# Patient Record
Sex: FEMALE | Race: BLACK OR AFRICAN AMERICAN | NOT HISPANIC OR LATINO | URBAN - METROPOLITAN AREA
[De-identification: names, ages, dates, MRNs, and addresses within clinical notes are randomized per-mention and may not be internally consistent; named-entity substitution may affect disease eponyms.]

---

## 2018-08-02 ENCOUNTER — OUTPATIENT (OUTPATIENT)
Dept: OUTPATIENT SERVICES | Facility: HOSPITAL | Age: 48
LOS: 1 days | Discharge: HOME | End: 2018-08-02
Payer: OTHER MISCELLANEOUS

## 2018-08-02 VITALS
HEIGHT: 64 IN | HEART RATE: 3 BPM | DIASTOLIC BLOOD PRESSURE: 89 MMHG | RESPIRATION RATE: 18 BRPM | TEMPERATURE: 98 F | OXYGEN SATURATION: 97 % | SYSTOLIC BLOOD PRESSURE: 143 MMHG | WEIGHT: 227.96 LBS

## 2018-08-02 DIAGNOSIS — Z01.818 ENCOUNTER FOR OTHER PREPROCEDURAL EXAMINATION: ICD-10-CM

## 2018-08-02 DIAGNOSIS — M54.17 RADICULOPATHY, LUMBOSACRAL REGION: ICD-10-CM

## 2018-08-02 DIAGNOSIS — J45.909 UNSPECIFIED ASTHMA, UNCOMPLICATED: ICD-10-CM

## 2018-08-02 DIAGNOSIS — M54.9 DORSALGIA, UNSPECIFIED: ICD-10-CM

## 2018-08-02 LAB
ANION GAP SERPL CALC-SCNC: 9 MMOL/L — SIGNIFICANT CHANGE UP (ref 5–17)
APTT BLD: 36.1 SEC — SIGNIFICANT CHANGE UP (ref 27.5–37.4)
BASOPHILS # BLD AUTO: 0.02 K/UL — SIGNIFICANT CHANGE UP (ref 0–0.2)
BASOPHILS NFR BLD AUTO: 0.2 % — SIGNIFICANT CHANGE UP (ref 0–2)
BUN SERPL-MCNC: 14 MG/DL — SIGNIFICANT CHANGE UP (ref 7–23)
CALCIUM SERPL-MCNC: 8.8 MG/DL — SIGNIFICANT CHANGE UP (ref 8.5–10.1)
CHLORIDE SERPL-SCNC: 106 MMOL/L — SIGNIFICANT CHANGE UP (ref 96–108)
CO2 SERPL-SCNC: 26 MMOL/L — SIGNIFICANT CHANGE UP (ref 22–31)
CREAT SERPL-MCNC: 0.98 MG/DL — SIGNIFICANT CHANGE UP (ref 0.5–1.3)
EOSINOPHIL # BLD AUTO: 0.16 K/UL — SIGNIFICANT CHANGE UP (ref 0–0.5)
EOSINOPHIL NFR BLD AUTO: 1.4 % — SIGNIFICANT CHANGE UP (ref 0–6)
GLUCOSE SERPL-MCNC: 89 MG/DL — SIGNIFICANT CHANGE UP (ref 70–99)
HBA1C BLD-MCNC: 6.3 % — HIGH (ref 4–5.6)
HCG UR QL: NEGATIVE — SIGNIFICANT CHANGE UP
HCT VFR BLD CALC: 32.7 % — LOW (ref 34.5–45)
HCV AB S/CO SERPL IA: 0.07 S/CO — SIGNIFICANT CHANGE UP
HCV AB SERPL-IMP: SIGNIFICANT CHANGE UP
HGB BLD-MCNC: 9.5 G/DL — LOW (ref 11.5–15.5)
HIV 1 & 2 AB SERPL IA.RAPID: SIGNIFICANT CHANGE UP
IMM GRANULOCYTES NFR BLD AUTO: 0.3 % — SIGNIFICANT CHANGE UP (ref 0–1.5)
INR BLD: 1.07 RATIO — SIGNIFICANT CHANGE UP (ref 0.88–1.16)
LYMPHOCYTES # BLD AUTO: 27.7 % — SIGNIFICANT CHANGE UP (ref 13–44)
LYMPHOCYTES # BLD AUTO: 3.27 K/UL — SIGNIFICANT CHANGE UP (ref 1–3.3)
MCHC RBC-ENTMCNC: 21.8 PG — LOW (ref 27–34)
MCHC RBC-ENTMCNC: 29.1 GM/DL — LOW (ref 32–36)
MCV RBC AUTO: 75 FL — LOW (ref 80–100)
MONOCYTES # BLD AUTO: 0.64 K/UL — SIGNIFICANT CHANGE UP (ref 0–0.9)
MONOCYTES NFR BLD AUTO: 5.4 % — SIGNIFICANT CHANGE UP (ref 2–14)
MRSA PCR RESULT.: SIGNIFICANT CHANGE UP
NEUTROPHILS # BLD AUTO: 7.67 K/UL — HIGH (ref 1.8–7.4)
NEUTROPHILS NFR BLD AUTO: 65 % — SIGNIFICANT CHANGE UP (ref 43–77)
PLATELET # BLD AUTO: 591 K/UL — HIGH (ref 150–400)
POTASSIUM SERPL-MCNC: 3.5 MMOL/L — SIGNIFICANT CHANGE UP (ref 3.5–5.3)
POTASSIUM SERPL-SCNC: 3.5 MMOL/L — SIGNIFICANT CHANGE UP (ref 3.5–5.3)
PROTHROM AB SERPL-ACNC: 11.7 SEC — SIGNIFICANT CHANGE UP (ref 9.8–12.7)
RBC # BLD: 4.36 M/UL — SIGNIFICANT CHANGE UP (ref 3.8–5.2)
RBC # FLD: 19.3 % — HIGH (ref 10.3–14.5)
S AUREUS DNA NOSE QL NAA+PROBE: SIGNIFICANT CHANGE UP
SODIUM SERPL-SCNC: 141 MMOL/L — SIGNIFICANT CHANGE UP (ref 135–145)
WBC # BLD: 11.8 K/UL — HIGH (ref 3.8–10.5)
WBC # FLD AUTO: 11.8 K/UL — HIGH (ref 3.8–10.5)

## 2018-08-02 PROCEDURE — 93010 ELECTROCARDIOGRAM REPORT: CPT | Mod: NC

## 2018-08-02 NOTE — H&P PST ADULT - NSANTHOSAYNRD_GEN_A_CORE
No. ESTER screening performed.  STOP BANG Legend: 0-2 = LOW Risk; 3-4 = INTERMEDIATE Risk; 5-8 = HIGH Risk

## 2018-08-02 NOTE — H&P PST ADULT - HISTORY OF PRESENT ILLNESS
47 yo female sustained back injury while on the job 10/7/2017. back pains worse despite conservative management- now has numbness down th leg. MRI - spinal compression- scheduled for spinal decompression

## 2018-08-02 NOTE — H&P PST ADULT - ASSESSMENT
lumbar compression  CAPRINI SCORE    AGE RELATED RISK FACTORS                                                       MOBILITY RELATED FACTORS  [x ] Age 41-60 years                                            (1 Point)                  [ ] Bed rest                                                        (1 Point)  [ ] Age: 61-74 years                                           (2 Points)                [ ] Plaster cast                                                   (2 Points)  [ ] Age= 75 years                                              (3 Points)                 [ ] Bed bound for more than 72 hours                   (2 Points)    DISEASE RELATED RISK FACTORS                                               GENDER SPECIFIC FACTORS  [ ] Edema in the lower extremities                       (1 Point)                  [ ] Pregnancy                                                     (1 Point)  [ ] Varicose veins                                               (1 Point)                  [ ] Post-partum < 6 weeks                                   (1 Point)             [x ] BMI > 25 Kg/m2                                            (1 Point)                  [ ] Hormonal therapy  or oral contraception            (1 Point)                 [ ] Sepsis (in the previous month)                        (1 Point)                  [ ] History of pregnancy complications  [ ] Pneumonia or serious lung disease                                               [ ] Unexplained or recurrent                       (1 Point)           (in the previous month)                               (1 Point)  [ ] Abnormal pulmonary function test                     (1 Point)                 SURGERY RELATED RISK FACTORS  [ ] Acute myocardial infarction                              (1 Point)                 [ ]  Section                                            (1 Point)  [ ] Congestive heart failure (in the previous month)  (1 Point)                 [ ] Minor surgery                                                 (1 Point)   [ ] Inflammatory bowel disease                             (1 Point)                 [x ] Arthroscopic surgery                                        (2 Points)  [ ] Central venous access                                    (2 Points)                [ ] General surgery lasting more than 45 minutes   (2 Points)       [ ] Stroke (in the previous month)                          (5 Points)               [ ] Elective arthroplasty                                        (5 Points)                                                                                                                                               HEMATOLOGY RELATED FACTORS                                                 TRAUMA RELATED RISK FACTORS  [ ] Prior episodes of VTE                                     (3 Points)                 [ ] Fracture of the hip, pelvis, or leg                       (5 Points)  [ ] Positive family history for VTE                         (3 Points)                 [ ] Acute spinal cord injury (in the previous month)  (5 Points)  [ ] Prothrombin 93546 A                                      (3 Points)                 [ ] Paralysis  (less than 1 month)                          (5 Points)  [ ] Factor V Leiden                                             (3 Points)                 [ ] Multiple Trauma within 1 month                         (5 Points)  [ ] Lupus anticoagulants                                     (3 Points)                                                           [ ] Anticardiolipin antibodies                                (3 Points)                                                       [ ] High homocysteine in the blood                      (3 Points)                                             [ ] Other congenital or acquired thrombophilia       (3 Points)                                                [ ] Heparin induced thrombocytopenia                  (3 Points)                                          Total Score [      4    ]

## 2018-08-03 RX ORDER — SODIUM CHLORIDE 9 MG/ML
3 INJECTION INTRAMUSCULAR; INTRAVENOUS; SUBCUTANEOUS EVERY 8 HOURS
Qty: 0 | Refills: 0 | Status: DISCONTINUED | OUTPATIENT
Start: 2018-08-14 | End: 2018-08-14

## 2018-08-14 ENCOUNTER — INPATIENT (INPATIENT)
Facility: HOSPITAL | Age: 48
LOS: 2 days | Discharge: HOME | End: 2018-08-17
Attending: ORTHOPAEDIC SURGERY | Admitting: ORTHOPAEDIC SURGERY
Payer: OTHER MISCELLANEOUS

## 2018-08-14 VITALS
RESPIRATION RATE: 16 BRPM | WEIGHT: 227.96 LBS | HEART RATE: 98 BPM | OXYGEN SATURATION: 98 % | SYSTOLIC BLOOD PRESSURE: 151 MMHG | TEMPERATURE: 98 F | HEIGHT: 64 IN | DIASTOLIC BLOOD PRESSURE: 88 MMHG

## 2018-08-14 LAB
ANION GAP SERPL CALC-SCNC: 12 MMOL/L — SIGNIFICANT CHANGE UP (ref 5–17)
BASOPHILS # BLD AUTO: 0.03 K/UL — SIGNIFICANT CHANGE UP (ref 0–0.2)
BASOPHILS # BLD AUTO: 0.03 K/UL — SIGNIFICANT CHANGE UP (ref 0–0.2)
BASOPHILS NFR BLD AUTO: 0.2 % — SIGNIFICANT CHANGE UP (ref 0–2)
BASOPHILS NFR BLD AUTO: 0.3 % — SIGNIFICANT CHANGE UP (ref 0–2)
BUN SERPL-MCNC: 9 MG/DL — SIGNIFICANT CHANGE UP (ref 7–23)
CALCIUM SERPL-MCNC: 8.4 MG/DL — LOW (ref 8.5–10.1)
CHLORIDE SERPL-SCNC: 102 MMOL/L — SIGNIFICANT CHANGE UP (ref 96–108)
CO2 SERPL-SCNC: 26 MMOL/L — SIGNIFICANT CHANGE UP (ref 22–31)
CREAT SERPL-MCNC: 1.27 MG/DL — SIGNIFICANT CHANGE UP (ref 0.5–1.3)
EOSINOPHIL # BLD AUTO: 0 K/UL — SIGNIFICANT CHANGE UP (ref 0–0.5)
EOSINOPHIL # BLD AUTO: 0.22 K/UL — SIGNIFICANT CHANGE UP (ref 0–0.5)
EOSINOPHIL NFR BLD AUTO: 0 % — SIGNIFICANT CHANGE UP (ref 0–6)
EOSINOPHIL NFR BLD AUTO: 2.1 % — SIGNIFICANT CHANGE UP (ref 0–6)
GLUCOSE SERPL-MCNC: 164 MG/DL — HIGH (ref 70–99)
HCG UR QL: NEGATIVE — SIGNIFICANT CHANGE UP
HCT VFR BLD CALC: 32.1 % — LOW (ref 34.5–45)
HCT VFR BLD CALC: 33.3 % — LOW (ref 34.5–45)
HGB BLD-MCNC: 10 G/DL — LOW (ref 11.5–15.5)
HGB BLD-MCNC: 9.6 G/DL — LOW (ref 11.5–15.5)
IMM GRANULOCYTES NFR BLD AUTO: 0.3 % — SIGNIFICANT CHANGE UP (ref 0–1.5)
IMM GRANULOCYTES NFR BLD AUTO: 1.2 % — SIGNIFICANT CHANGE UP (ref 0–1.5)
LYMPHOCYTES # BLD AUTO: 15.4 % — SIGNIFICANT CHANGE UP (ref 13–44)
LYMPHOCYTES # BLD AUTO: 2.65 K/UL — SIGNIFICANT CHANGE UP (ref 1–3.3)
LYMPHOCYTES # BLD AUTO: 4.46 K/UL — HIGH (ref 1–3.3)
LYMPHOCYTES # BLD AUTO: 41.7 % — SIGNIFICANT CHANGE UP (ref 13–44)
MCHC RBC-ENTMCNC: 22.6 PG — LOW (ref 27–34)
MCHC RBC-ENTMCNC: 22.7 PG — LOW (ref 27–34)
MCHC RBC-ENTMCNC: 29.9 GM/DL — LOW (ref 32–36)
MCHC RBC-ENTMCNC: 30 GM/DL — LOW (ref 32–36)
MCV RBC AUTO: 75.2 FL — LOW (ref 80–100)
MCV RBC AUTO: 75.9 FL — LOW (ref 80–100)
MONOCYTES # BLD AUTO: 0.21 K/UL — SIGNIFICANT CHANGE UP (ref 0–0.9)
MONOCYTES # BLD AUTO: 0.62 K/UL — SIGNIFICANT CHANGE UP (ref 0–0.9)
MONOCYTES NFR BLD AUTO: 1.2 % — LOW (ref 2–14)
MONOCYTES NFR BLD AUTO: 5.8 % — SIGNIFICANT CHANGE UP (ref 2–14)
NEUTROPHILS # BLD AUTO: 14.16 K/UL — HIGH (ref 1.8–7.4)
NEUTROPHILS # BLD AUTO: 5.34 K/UL — SIGNIFICANT CHANGE UP (ref 1.8–7.4)
NEUTROPHILS NFR BLD AUTO: 49.8 % — SIGNIFICANT CHANGE UP (ref 43–77)
NEUTROPHILS NFR BLD AUTO: 82 % — HIGH (ref 43–77)
PLATELET # BLD AUTO: 467 K/UL — HIGH (ref 150–400)
PLATELET # BLD AUTO: 509 K/UL — HIGH (ref 150–400)
POTASSIUM SERPL-MCNC: 4.3 MMOL/L — SIGNIFICANT CHANGE UP (ref 3.5–5.3)
POTASSIUM SERPL-SCNC: 4.3 MMOL/L — SIGNIFICANT CHANGE UP (ref 3.5–5.3)
RBC # BLD: 4.23 M/UL — SIGNIFICANT CHANGE UP (ref 3.8–5.2)
RBC # BLD: 4.43 M/UL — SIGNIFICANT CHANGE UP (ref 3.8–5.2)
RBC # FLD: 19.2 % — HIGH (ref 10.3–14.5)
RBC # FLD: 19.6 % — HIGH (ref 10.3–14.5)
SODIUM SERPL-SCNC: 140 MMOL/L — SIGNIFICANT CHANGE UP (ref 135–145)
WBC # BLD: 10.7 K/UL — HIGH (ref 3.8–10.5)
WBC # BLD: 17.26 K/UL — HIGH (ref 3.8–10.5)
WBC # FLD AUTO: 10.7 K/UL — HIGH (ref 3.8–10.5)
WBC # FLD AUTO: 17.26 K/UL — HIGH (ref 3.8–10.5)

## 2018-08-14 RX ORDER — HYDROMORPHONE HYDROCHLORIDE 2 MG/ML
0.5 INJECTION INTRAMUSCULAR; INTRAVENOUS; SUBCUTANEOUS
Qty: 0 | Refills: 0 | Status: DISCONTINUED | OUTPATIENT
Start: 2018-08-14 | End: 2018-08-17

## 2018-08-14 RX ORDER — CYCLOBENZAPRINE HYDROCHLORIDE 10 MG/1
10 TABLET, FILM COATED ORAL EVERY 8 HOURS
Qty: 0 | Refills: 0 | Status: DISCONTINUED | OUTPATIENT
Start: 2018-08-14 | End: 2018-08-17

## 2018-08-14 RX ORDER — SODIUM CHLORIDE 9 MG/ML
1000 INJECTION, SOLUTION INTRAVENOUS
Qty: 0 | Refills: 0 | Status: DISCONTINUED | OUTPATIENT
Start: 2018-08-14 | End: 2018-08-15

## 2018-08-14 RX ORDER — DIAZEPAM 5 MG
5 TABLET ORAL EVERY 8 HOURS
Qty: 0 | Refills: 0 | Status: DISCONTINUED | OUTPATIENT
Start: 2018-08-14 | End: 2018-08-15

## 2018-08-14 RX ORDER — ONDANSETRON 8 MG/1
4 TABLET, FILM COATED ORAL EVERY 6 HOURS
Qty: 0 | Refills: 0 | Status: DISCONTINUED | OUTPATIENT
Start: 2018-08-14 | End: 2018-08-17

## 2018-08-14 RX ORDER — FOLIC ACID 0.8 MG
1 TABLET ORAL DAILY
Qty: 0 | Refills: 0 | Status: DISCONTINUED | OUTPATIENT
Start: 2018-08-14 | End: 2018-08-17

## 2018-08-14 RX ORDER — ACETAMINOPHEN 500 MG
650 TABLET ORAL EVERY 6 HOURS
Qty: 0 | Refills: 0 | Status: DISCONTINUED | OUTPATIENT
Start: 2018-08-14 | End: 2018-08-17

## 2018-08-14 RX ORDER — DIPHENHYDRAMINE HCL 50 MG
25 CAPSULE ORAL AT BEDTIME
Qty: 0 | Refills: 0 | Status: DISCONTINUED | OUTPATIENT
Start: 2018-08-14 | End: 2018-08-17

## 2018-08-14 RX ORDER — OXYCODONE HYDROCHLORIDE 5 MG/1
10 TABLET ORAL EVERY 4 HOURS
Qty: 0 | Refills: 0 | Status: DISCONTINUED | OUTPATIENT
Start: 2018-08-14 | End: 2018-08-17

## 2018-08-14 RX ORDER — ASCORBIC ACID 60 MG
500 TABLET,CHEWABLE ORAL
Qty: 0 | Refills: 0 | Status: DISCONTINUED | OUTPATIENT
Start: 2018-08-14 | End: 2018-08-17

## 2018-08-14 RX ORDER — CYCLOBENZAPRINE HYDROCHLORIDE 10 MG/1
1 TABLET, FILM COATED ORAL
Qty: 0 | Refills: 0 | COMMUNITY

## 2018-08-14 RX ORDER — DIPHENHYDRAMINE HCL 50 MG
25 CAPSULE ORAL EVERY 6 HOURS
Qty: 0 | Refills: 0 | Status: DISCONTINUED | OUTPATIENT
Start: 2018-08-14 | End: 2018-08-17

## 2018-08-14 RX ORDER — CEFAZOLIN SODIUM 1 G
2000 VIAL (EA) INJECTION EVERY 8 HOURS
Qty: 0 | Refills: 0 | Status: DISCONTINUED | OUTPATIENT
Start: 2018-08-14 | End: 2018-08-17

## 2018-08-14 RX ORDER — SODIUM CHLORIDE 9 MG/ML
1000 INJECTION, SOLUTION INTRAVENOUS
Qty: 0 | Refills: 0 | Status: DISCONTINUED | OUTPATIENT
Start: 2018-08-14 | End: 2018-08-14

## 2018-08-14 RX ORDER — OXYCODONE HYDROCHLORIDE 5 MG/1
5 TABLET ORAL EVERY 4 HOURS
Qty: 0 | Refills: 0 | Status: DISCONTINUED | OUTPATIENT
Start: 2018-08-14 | End: 2018-08-17

## 2018-08-14 RX ORDER — SENNA PLUS 8.6 MG/1
2 TABLET ORAL AT BEDTIME
Qty: 0 | Refills: 0 | Status: DISCONTINUED | OUTPATIENT
Start: 2018-08-14 | End: 2018-08-17

## 2018-08-14 RX ORDER — BENZOCAINE AND MENTHOL 5; 1 G/100ML; G/100ML
1 LIQUID ORAL EVERY 4 HOURS
Qty: 0 | Refills: 0 | Status: DISCONTINUED | OUTPATIENT
Start: 2018-08-14 | End: 2018-08-17

## 2018-08-14 RX ORDER — HYDROMORPHONE HYDROCHLORIDE 2 MG/ML
1 INJECTION INTRAMUSCULAR; INTRAVENOUS; SUBCUTANEOUS
Qty: 0 | Refills: 0 | Status: DISCONTINUED | OUTPATIENT
Start: 2018-08-14 | End: 2018-08-17

## 2018-08-14 RX ORDER — ONDANSETRON 8 MG/1
4 TABLET, FILM COATED ORAL ONCE
Qty: 0 | Refills: 0 | Status: DISCONTINUED | OUTPATIENT
Start: 2018-08-14 | End: 2018-08-14

## 2018-08-14 RX ORDER — DOCUSATE SODIUM 100 MG
100 CAPSULE ORAL THREE TIMES A DAY
Qty: 0 | Refills: 0 | Status: DISCONTINUED | OUTPATIENT
Start: 2018-08-14 | End: 2018-08-17

## 2018-08-14 RX ADMIN — HYDROMORPHONE HYDROCHLORIDE 0.5 MILLIGRAM(S): 2 INJECTION INTRAMUSCULAR; INTRAVENOUS; SUBCUTANEOUS at 17:10

## 2018-08-14 RX ADMIN — Medication 100 MILLIGRAM(S): at 21:32

## 2018-08-14 RX ADMIN — Medication 500 MILLIGRAM(S): at 18:08

## 2018-08-14 RX ADMIN — OXYCODONE HYDROCHLORIDE 10 MILLIGRAM(S): 5 TABLET ORAL at 19:20

## 2018-08-14 RX ADMIN — Medication 100 MILLIGRAM(S): at 18:08

## 2018-08-14 RX ADMIN — Medication 1 TABLET(S): at 21:32

## 2018-08-14 RX ADMIN — SENNA PLUS 2 TABLET(S): 8.6 TABLET ORAL at 21:32

## 2018-08-14 RX ADMIN — HYDROMORPHONE HYDROCHLORIDE 0.5 MILLIGRAM(S): 2 INJECTION INTRAMUSCULAR; INTRAVENOUS; SUBCUTANEOUS at 16:45

## 2018-08-14 RX ADMIN — OXYCODONE HYDROCHLORIDE 10 MILLIGRAM(S): 5 TABLET ORAL at 18:35

## 2018-08-14 RX ADMIN — SODIUM CHLORIDE 100 MILLILITER(S): 9 INJECTION, SOLUTION INTRAVENOUS at 16:12

## 2018-08-14 RX ADMIN — SODIUM CHLORIDE 75 MILLILITER(S): 9 INJECTION, SOLUTION INTRAVENOUS at 21:35

## 2018-08-14 NOTE — BRIEF OPERATIVE NOTE - PROCEDURE
<<-----Click on this checkbox to enter Procedure TLIF, 1 level, posterior approach  08/15/2018    Active  ANNIKA  Posterior spinal instrumentation of 3 to 6 vertebral segments  08/15/2018    Active  ANNIKA

## 2018-08-14 NOTE — PROGRESS NOTE ADULT - SUBJECTIVE AND OBJECTIVE BOX
[facility]  Operative Report  Date of Surgery:08/14/2018  Patient: Priscila Cuba  Surgeon: Tigist Gill DO – Orthopedic Surgery Attending  Assistant: Orthopedic Resident Dr. Soria and Dr. Abraham  Dictation:   Preop Diagnosis:  Lumbar Disc Herniation L5/S1, Lumbar Stenosis L5/S1, Broad based bulge L4/5, lumbar radiculopathy, severe interactable back pain, difficulty with ambulating, patient had multiple rounds of epidural injections.  Symptoms ongoing for > 6 months.    Post op Diagnosis: Same  Procedure Summary:   L5/S1 Partial Facetectomy,  Right sided zia Laminectomy of L5,    Right sided hemilaminectomy of S1  Left sided Superior laminectomy of S1  Left sided Inferior hemilaminectomy of L5  Right sided inferior zia Laminectomy of L4,    Right sided superior hemilaminectomy of L5  Left sided Superior laminectomy of S1  Left sided Inferior hemilaminectomy of L5  Segmental Instrumentation L4/5/S1 with Pedicle Screw Insertion and Interconnecting bilateral rods  Adilson Osteotomy L5/S1  Biomechanical cage L5/S1  Interbody fusion L5/S1  Arthrodesis L5/S1 Posterolateral  Arthrodesis L4/L5 Posterolateral  Subtotal Discectomy L5/S1  Local autograft Harvestation  Fluoroscopy  High field magnification for decompression  Allograft  BMP    Anesthesia: General Endotracheal Intubation    Positioning: Prone on Nick Table with Ankush frame    Complication None:    Procedure in Detail:  Preoperative Consent was obtained.  Patient has severe back pain and bilateral leg pain. Patient has significant bilateral leg pain.  She reports overall pain score is severe with activity and moderate even on rest on most days.  Symptoms ongoing for greater than 1 year.   Due to severe back pain, lumbar fusion was added to her surgical treatment. Symptoms have not improved with multiple treatments and a long non operative course.  Extensive discussion regarding patient’s diagnosis, imaging, operative and non-operative options discussed with patient over multiple visits.  All questions are answered.  Informed consent in obtained.  No guarantees implied.  Goals are improvement in pain and quality of life.  Some residual discomfort expected.    Patient received general anesthesia.  Neuromonitoring devices were placed by neuromonitoring service.  Dean catheter was inserted. Patient was placed prone on the operative table.  All bony prominences were padded.  Neuromonitoring signals were confirmed.     Patient’s lumbar spine was prepped and draped in sterile manner.  Flouroscopy was used to confirm the location of the pedicle. Midline incision were made and lamina of L4, L5 and S1 exposed.      Interspace confirmed with xray.  Subsequently, facet joints and TP was exposed from L4-L5-S1.        Each pedicle was then tapped and stimulated.  All screws were above the threshold of 10. Pedicle stimulation was more than 10 at all levels.  Subsequently 5.5*40mm, 5.5 *45mm and 5.5*35mm, 6.5*40mm Medtronic Solera pedicle screws were instrumented due to excellent bone quality otherwise. At this point wound was irrigated.  Midline interspinous ligament was preserved to prevent any additional instability of spine and part of minimum invasive techniques.      For Decompression, I performed 4 bilateral hemilaminectomies.  2 level hemilaminectomy is performed at each level bilateral.      First we started at L4/L5 level on right.  Inferior lamina of L4 is resected and superior lamina of L5 on the right is resected.  Ligamentum flavum is release from its attachment on L5 and then released laterally and proximally.   This is done first on the left side.  Nerve root is free.    Next we started at L4/L5 level on left.  Inferior lamina of L4 is resected and superior lamina of L5 on the left is resected.  Ligamentum flavum is release from its attachment on L5 and then released laterally and proximally.   This is done first on the right side.  Nerve root is free.    Next we started at L5/S1 level on left.  Inferior lamina of L5 is resected and superior lamina of S1 on the left is resected.  Ligamentum flavum is release from its attachment on L5 and then released laterally and proximally.   This is done first on the left side.  Nerve root is free.    Next we started at L5/S1 level on right.  Inferior lamina of L5 is resected and superior lamina of S1 on the right is resected.  Ligamentum flavum is release from its attachment on L5 and then released laterally and proximally.   This is done first on the right side.  Nerve root is free.    Next Adilson osteotomy, is performed on the right at L5/S1 to correct the retrolisthesis at L5/S1.  Complete L5 facet is removed and superior S1 facet is removed.  Subsequently, discectomy is performed and then disc space is prepared with jennifer, curretes and rasps.    Next,harvested autograft bone  and allograft graft is packed into the disc space and cage and inserted then Interbody cage is inserted.  It's location confirmed and then expanded to restore the height of the L5/S1 disc space and obtain foraminal decompression.     No pressure on the nerve roots noted at all 4 levels. [place] was used to confirm adequate foraminal space as well.  Subsequently, BMP, autograft from the facets and partial laminotomy and decortication and allograft using coricocancellous chips were placed in the gutters.  Subsequently, concepción was inserted and set screws placed.  All set screws were final tightened.  Final xrays were taken and I was satisfied with overall positioning of hardware and instrumentation.     Next, each incision was closed with number 1 vicryl for fascia, 0 vicryl for subcutaneous tissue and 2.0 vicryl.  Drain was inserted over the epidural space.   2gm of vancomycin powder was also placed in deep and superficial fascia.    Skin adhesive was applied.  Dry sterile dressing was applied.  Patient was transferred to supine position on regular bed.  Patient was extubated.  Patient had tolerated the procedure well.  Patient was moving b/l lower extremity    I personally spoke with patient's family at the end of the case.    Tigist Gill DO  Orthopedic Spine Surgeon

## 2018-08-15 LAB
ANION GAP SERPL CALC-SCNC: 8 MMOL/L — SIGNIFICANT CHANGE UP (ref 5–17)
BASOPHILS # BLD AUTO: 0.02 K/UL — SIGNIFICANT CHANGE UP (ref 0–0.2)
BASOPHILS NFR BLD AUTO: 0.1 % — SIGNIFICANT CHANGE UP (ref 0–2)
BUN SERPL-MCNC: 8 MG/DL — SIGNIFICANT CHANGE UP (ref 7–23)
CALCIUM SERPL-MCNC: 8.8 MG/DL — SIGNIFICANT CHANGE UP (ref 8.5–10.1)
CHLORIDE SERPL-SCNC: 104 MMOL/L — SIGNIFICANT CHANGE UP (ref 96–108)
CO2 SERPL-SCNC: 29 MMOL/L — SIGNIFICANT CHANGE UP (ref 22–31)
CREAT SERPL-MCNC: 0.93 MG/DL — SIGNIFICANT CHANGE UP (ref 0.5–1.3)
EOSINOPHIL # BLD AUTO: 0 K/UL — SIGNIFICANT CHANGE UP (ref 0–0.5)
EOSINOPHIL NFR BLD AUTO: 0 % — SIGNIFICANT CHANGE UP (ref 0–6)
GLUCOSE SERPL-MCNC: 112 MG/DL — HIGH (ref 70–99)
HCT VFR BLD CALC: 31.4 % — LOW (ref 34.5–45)
HGB BLD-MCNC: 9.2 G/DL — LOW (ref 11.5–15.5)
IMM GRANULOCYTES NFR BLD AUTO: 0.7 % — SIGNIFICANT CHANGE UP (ref 0–1.5)
LYMPHOCYTES # BLD AUTO: 11.4 % — LOW (ref 13–44)
LYMPHOCYTES # BLD AUTO: 2.26 K/UL — SIGNIFICANT CHANGE UP (ref 1–3.3)
MCHC RBC-ENTMCNC: 22.4 PG — LOW (ref 27–34)
MCHC RBC-ENTMCNC: 29.3 GM/DL — LOW (ref 32–36)
MCV RBC AUTO: 76.6 FL — LOW (ref 80–100)
MONOCYTES # BLD AUTO: 1.11 K/UL — HIGH (ref 0–0.9)
MONOCYTES NFR BLD AUTO: 5.6 % — SIGNIFICANT CHANGE UP (ref 2–14)
NEUTROPHILS # BLD AUTO: 16.32 K/UL — HIGH (ref 1.8–7.4)
NEUTROPHILS NFR BLD AUTO: 82.2 % — HIGH (ref 43–77)
PLATELET # BLD AUTO: 486 K/UL — HIGH (ref 150–400)
POTASSIUM SERPL-MCNC: 4.1 MMOL/L — SIGNIFICANT CHANGE UP (ref 3.5–5.3)
POTASSIUM SERPL-SCNC: 4.1 MMOL/L — SIGNIFICANT CHANGE UP (ref 3.5–5.3)
RBC # BLD: 4.1 M/UL — SIGNIFICANT CHANGE UP (ref 3.8–5.2)
RBC # FLD: 19.4 % — HIGH (ref 10.3–14.5)
SODIUM SERPL-SCNC: 141 MMOL/L — SIGNIFICANT CHANGE UP (ref 135–145)
WBC # BLD: 19.84 K/UL — HIGH (ref 3.8–10.5)
WBC # FLD AUTO: 19.84 K/UL — HIGH (ref 3.8–10.5)

## 2018-08-15 RX ORDER — IBUPROFEN 200 MG
1 TABLET ORAL
Qty: 0 | Refills: 0 | COMMUNITY

## 2018-08-15 RX ADMIN — Medication 650 MILLIGRAM(S): at 18:40

## 2018-08-15 RX ADMIN — Medication 100 MILLIGRAM(S): at 19:39

## 2018-08-15 RX ADMIN — Medication 100 MILLIGRAM(S): at 02:07

## 2018-08-15 RX ADMIN — OXYCODONE HYDROCHLORIDE 10 MILLIGRAM(S): 5 TABLET ORAL at 07:20

## 2018-08-15 RX ADMIN — Medication 100 MILLIGRAM(S): at 21:37

## 2018-08-15 RX ADMIN — Medication 500 MILLIGRAM(S): at 06:22

## 2018-08-15 RX ADMIN — OXYCODONE HYDROCHLORIDE 10 MILLIGRAM(S): 5 TABLET ORAL at 06:22

## 2018-08-15 RX ADMIN — Medication 1 TABLET(S): at 06:22

## 2018-08-15 RX ADMIN — SODIUM CHLORIDE 75 MILLILITER(S): 9 INJECTION, SOLUTION INTRAVENOUS at 11:57

## 2018-08-15 RX ADMIN — CYCLOBENZAPRINE HYDROCHLORIDE 10 MILLIGRAM(S): 10 TABLET, FILM COATED ORAL at 21:38

## 2018-08-15 RX ADMIN — Medication 1 TABLET(S): at 13:24

## 2018-08-15 RX ADMIN — Medication 1 TABLET(S): at 11:57

## 2018-08-15 RX ADMIN — Medication 100 MILLIGRAM(S): at 13:24

## 2018-08-15 RX ADMIN — Medication 500 MILLIGRAM(S): at 17:23

## 2018-08-15 RX ADMIN — OXYCODONE HYDROCHLORIDE 10 MILLIGRAM(S): 5 TABLET ORAL at 13:23

## 2018-08-15 RX ADMIN — Medication 100 MILLIGRAM(S): at 06:22

## 2018-08-15 RX ADMIN — Medication 100 MILLIGRAM(S): at 11:15

## 2018-08-15 RX ADMIN — OXYCODONE HYDROCHLORIDE 10 MILLIGRAM(S): 5 TABLET ORAL at 19:35

## 2018-08-15 RX ADMIN — Medication 1 MILLIGRAM(S): at 11:57

## 2018-08-15 RX ADMIN — HYDROMORPHONE HYDROCHLORIDE 1 MILLIGRAM(S): 2 INJECTION INTRAMUSCULAR; INTRAVENOUS; SUBCUTANEOUS at 10:20

## 2018-08-15 RX ADMIN — OXYCODONE HYDROCHLORIDE 10 MILLIGRAM(S): 5 TABLET ORAL at 18:39

## 2018-08-15 RX ADMIN — HYDROMORPHONE HYDROCHLORIDE 1 MILLIGRAM(S): 2 INJECTION INTRAMUSCULAR; INTRAVENOUS; SUBCUTANEOUS at 09:47

## 2018-08-15 RX ADMIN — Medication 1 TABLET(S): at 21:37

## 2018-08-15 RX ADMIN — OXYCODONE HYDROCHLORIDE 10 MILLIGRAM(S): 5 TABLET ORAL at 14:30

## 2018-08-15 RX ADMIN — SENNA PLUS 2 TABLET(S): 8.6 TABLET ORAL at 21:36

## 2018-08-15 NOTE — DISCHARGE NOTE ADULT - PAIN PRESENT
Take your medications exactly as prescribed. Having your pain under control will help increase activity, improve deep breathing and coughing and prevent complications like pneumonia and blood clots in your legs. Some of the common side effects of pain medications are nausea, vomiting, itching, rash and upset stomach. Contact your doctor if you develop these or any other unusual systoms. Eat a diet rich in fiber and drink plenty of oral fluids. Use other pain management methods like, cold and warm applications, elevation of affected body part, listening to music, watching TV, yoga, etc./No

## 2018-08-15 NOTE — DISCHARGE NOTE ADULT - CARE PROVIDER_API CALL
Tigist Gill (DO), Orthopaedic Surgery Orthopaedics Surgery  [address] [place] [address]  Phone: [phone]  Fax: [phone]

## 2018-08-15 NOTE — PHYSICAL THERAPY INITIAL EVALUATION ADULT - GAIT DEVIATIONS NOTED, PT EVAL
decreased velocity of limb motion/decreased step length/decreased stride length/decreased weight-shifting ability/increased time in double stance/decreased navi

## 2018-08-15 NOTE — PHYSICAL THERAPY INITIAL EVALUATION ADULT - CRITERIA FOR SKILLED THERAPEUTIC INTERVENTIONS
functional limitations in following categories/impairments found/therapy frequency/predicted duration of therapy intervention/anticipated discharge recommendation/rehab potential/anticipated equipment needs at discharge/risk reduction/prevention

## 2018-08-15 NOTE — DISCHARGE NOTE ADULT - CARE PLAN
Principal Discharge DX:	Radiculopathy due to lumbar intervertebral disc disorder  Goal:	Return to ADLs  Assessment and plan of treatment:	1.	Pain Control  2.	Walking with full weight bearing as tolerated, with assistive devices (walker/Cane as Needed). Wear LSO brace when walking as needed.  3.	PT as needed  4.	Follow up with Dr. Gill as outpatient in 7-10 days after discharge from the hospital or rehab. Call office for appointment.  5.	Keep dressing clean and dry. Remove on Post Op Day three.  6.	No baths/hot tubs or soaks.

## 2018-08-15 NOTE — PROGRESS NOTE ADULT - ASSESSMENT
A/P:  47 yo F s/p PSIF L4-S1 w/ TLIF L5-S1 POD 1:  -pain control  -SCDs  -PT/OT/WBAT  -Continue to monitor drain outputs  -Continue ancef while drains in  -LSO brace ordered, f/u  -WBXR ordered for when pt is able to ambulate  -dispo planning

## 2018-08-15 NOTE — PHYSICAL THERAPY INITIAL EVALUATION ADULT - GENERAL OBSERVATIONS, REHAB EVAL
Pt was seen in supine c IV, Dean and lumbar  dressing and 2 Ankush Royal drainage intact,  Venodyne pumps donned, alert and Ox4. Pt was instructed spinal precaution prior to P.T. intervention and pt had verbal understanding. Pt was motivated.

## 2018-08-15 NOTE — PROGRESS NOTE ADULT - SUBJECTIVE AND OBJECTIVE BOX
24 Hr Events:  Pt complains of pain and soreness. No acute events overnight. No sob/cp/n/v.    Vital Signs Last 24 Hrs  T(C): 36.6 (15 Aug 2018 04:40), Max: 36.8 (14 Aug 2018 15:35)  T(F): 97.8 (15 Aug 2018 04:40), Max: 98.3 (14 Aug 2018 15:35)  HR: 98 (15 Aug 2018 04:40) (86 - 103)  BP: 145/70 (15 Aug 2018 04:40) (120/83 - 151/88)  BP(mean): --  RR: 16 (15 Aug 2018 04:40) (15 - 19)  SpO2: 96% (15 Aug 2018 04:40) (95% - 100%)    PE:  Gen: NAD  Spine:  PE limited by exam  Dressing CDI  TERRY x2 (R and L)  Compartments soft and compressible, no calf ttp  +EHL/FHL/Ta/Gsc  SILT L2-S1, L1-L2 sensation L>R  SILT C5-T1, C7-C8 sensation L>R  LLE 5/5 hip flexion, knee extension, plantar/dorsiflexion  RLE difficult to assess due to pain and refusal of patient to attempt  2+ DP

## 2018-08-15 NOTE — PHYSICAL THERAPY INITIAL EVALUATION ADULT - IMPAIRMENTS FOUND, PT EVAL
ergonomics and body mechanics/muscle strength/posture/aerobic capacity/endurance/gait, locomotion, and balance

## 2018-08-15 NOTE — DISCHARGE NOTE ADULT - HOSPITAL COURSE
The patient is a 48 year old F status post PSIF L4-S1, TLIF L5-S1 after being admitted through [facility] Emergency Room for intractable Low Back Pain with Radiculopathy. The Patient was medically Optimized for the Previously mentioned surgical procedure. The patient was taken to the operating room on date mentioned above. Prophylactic antibiotics were started before the procedure and continued for 24 hours.  There were no complications during the procedure and patient tolerated the procedure well.  The patient was transferred to recovery room in stable condition and subsequently to surgical floor.  Patient was given SCD’s for DVT prophylaxis.  All home medications were continued.  The patient received physical therapy daily and daily labs were followed.  The dressing was kept clean, dry, intact. The Drain Was removed when output was appropriately decreased. Pt was provided with LSO back brace. The rest of the hospital stay was unremarkable. The patient was discharged in stable condition to follow up as outpatient.

## 2018-08-15 NOTE — PHYSICAL THERAPY INITIAL EVALUATION ADULT - IMPAIRMENTS CONTRIBUTING TO GAIT DEVIATIONS, PT EVAL
narrow base of support/pain/impaired postural control/impaired balance/decreased strength/decreased flexibility

## 2018-08-15 NOTE — DISCHARGE NOTE ADULT - PLAN OF CARE
Return to ADLs 1.	Pain Control  2.	Walking with full weight bearing as tolerated, with assistive devices (walker/Cane as Needed). Wear LSO brace when walking as needed.  3.	PT as needed  4.	Follow up with Dr. Gill as outpatient in 7-10 days after discharge from the hospital or rehab. Call office for appointment.  5.	Keep dressing clean and dry. Remove on Post Op Day three.  6.	No baths/hot tubs or soaks.

## 2018-08-15 NOTE — DISCHARGE NOTE ADULT - MEDICATION SUMMARY - MEDICATIONS TO STOP TAKING
I will STOP taking the medications listed below when I get home from the hospital:    ibuprofen 600 mg oral tablet  -- 1 tab(s) by mouth 2 times a day, As Needed

## 2018-08-15 NOTE — PHYSICAL THERAPY INITIAL EVALUATION ADULT - PLANNED THERAPY INTERVENTIONS, PT EVAL
lumbar stabilization/balance training/gait training/bed mobility training/postural re-education/strengthening/transfer training

## 2018-08-15 NOTE — DISCHARGE NOTE ADULT - MEDICATION SUMMARY - MEDICATIONS TO TAKE
I will START or STAY ON the medications listed below when I get home from the hospital:    Tad Red  -- 1 tab(s) by mouth once a day  -- Indication: For home med    LSO Brace  -- Dx: L4-S1 Radiculopathy  Sx: PSF L4-5, L5-S1  ICD-10: M54.16  GINETTE 99  Ht/Wt: 162 cm/103 kg  -- Indication: For for back    Percocet 5/325 oral tablet  -- 1 tab(s) by mouth every 4 to 6 hours, As Needed -for severe pain MDD:6   -- Caution federal law prohibits the transfer of this drug to any person other  than the person for whom it was prescribed.  May cause drowsiness.  Alcohol may intensify this effect.  Use care when operating dangerous machinery.  This prescription cannot be refilled.  This product contains acetaminophen.  Do not use  with any other product containing acetaminophen to prevent possible liver damage.  Using more of this medication than prescribed may cause serious breathing problems.    -- Indication: For prn for pain    cyclobenzaprine 5 mg oral tablet  -- 1 tab(s) by mouth 2 times a day  -- Indication: For prn for muscle spasm

## 2018-08-15 NOTE — DISCHARGE NOTE ADULT - PATIENT PORTAL LINK FT
You can access the [facility], offered by [facility], by registering with the following website: [email or web address]

## 2018-08-15 NOTE — PROGRESS NOTE ADULT - SUBJECTIVE AND OBJECTIVE BOX
Pt S/E at bedside, tolerated procedure well, pain controlled    Vital Signs Last 24 Hrs  T(C): 36.1 (14 Aug 2018 20:40), Max: 36.8 (14 Aug 2018 15:35)  T(F): 97 (14 Aug 2018 20:40), Max: 98.3 (14 Aug 2018 15:35)  HR: 91 (14 Aug 2018 20:40) (89 - 103)  BP: 130/73 (14 Aug 2018 20:40) (120/83 - 151/88)  BP(mean): --  RR: 16 (14 Aug 2018 20:40) (15 - 19)  SpO2: 100% (14 Aug 2018 20:40) (95% - 100%)  Gen: NAD, AAOx3    Spine:  Dressing clean dry intact  TERRY x2- right and left  +EHL/FHL/TA/GS  SILT L2-S1, S1 sensation decreased on right compared to the left  +DP Pulses  Compartments soft  No calf TTP B/L    48F s/p PSIF L4-S1 with TLIF L5-S1 POD 0  pain control  WBAT  PT  OT  trend drain outputs  ancef while drains in  LSO brace ordered  weight bearing XR ordered for when pt is ambulating  dispo planning Pt S/E at bedside, tolerated procedure well, pain controlled    Vital Signs Last 24 Hrs  T(C): 36.1 (14 Aug 2018 20:40), Max: 36.8 (14 Aug 2018 15:35)  T(F): 97 (14 Aug 2018 20:40), Max: 98.3 (14 Aug 2018 15:35)  HR: 91 (14 Aug 2018 20:40) (89 - 103)  BP: 130/73 (14 Aug 2018 20:40) (120/83 - 151/88)  BP(mean): --  RR: 16 (14 Aug 2018 20:40) (15 - 19)  SpO2: 100% (14 Aug 2018 20:40) (95% - 100%)  Gen: NAD, AAOx3    Spine:  Dressing clean dry intact  TERRY x2- right and left  +EHL/FHL/TA/GS  SILT L2-S1, S1 sensation decreased on right compared to the left  +DP Pulses  Compartments soft  No calf TTP B/L    48F s/p PSIF L4-S1 with TLIF L5-S1 POD 0  pain control  SCDs  WBAT  PT  OT  trend drain outputs  ancef while drains in  LSO brace ordered  weight bearing XR ordered for when pt is ambulating  dispo planning

## 2018-08-16 LAB
ANION GAP SERPL CALC-SCNC: 9 MMOL/L — SIGNIFICANT CHANGE UP (ref 5–17)
BASOPHILS # BLD AUTO: 0.04 K/UL — SIGNIFICANT CHANGE UP (ref 0–0.2)
BASOPHILS NFR BLD AUTO: 0.2 % — SIGNIFICANT CHANGE UP (ref 0–2)
BUN SERPL-MCNC: 11 MG/DL — SIGNIFICANT CHANGE UP (ref 7–23)
CALCIUM SERPL-MCNC: 8.7 MG/DL — SIGNIFICANT CHANGE UP (ref 8.5–10.1)
CHLORIDE SERPL-SCNC: 102 MMOL/L — SIGNIFICANT CHANGE UP (ref 96–108)
CO2 SERPL-SCNC: 30 MMOL/L — SIGNIFICANT CHANGE UP (ref 22–31)
CREAT SERPL-MCNC: 0.86 MG/DL — SIGNIFICANT CHANGE UP (ref 0.5–1.3)
EOSINOPHIL # BLD AUTO: 0.02 K/UL — SIGNIFICANT CHANGE UP (ref 0–0.5)
EOSINOPHIL NFR BLD AUTO: 0.1 % — SIGNIFICANT CHANGE UP (ref 0–6)
GLUCOSE SERPL-MCNC: 118 MG/DL — HIGH (ref 70–99)
HCT VFR BLD CALC: 27.9 % — LOW (ref 34.5–45)
HGB BLD-MCNC: 8.3 G/DL — LOW (ref 11.5–15.5)
IMM GRANULOCYTES NFR BLD AUTO: 0.7 % — SIGNIFICANT CHANGE UP (ref 0–1.5)
LYMPHOCYTES # BLD AUTO: 17.9 % — SIGNIFICANT CHANGE UP (ref 13–44)
LYMPHOCYTES # BLD AUTO: 3.92 K/UL — HIGH (ref 1–3.3)
MCHC RBC-ENTMCNC: 22.7 PG — LOW (ref 27–34)
MCHC RBC-ENTMCNC: 29.7 GM/DL — LOW (ref 32–36)
MCV RBC AUTO: 76.4 FL — LOW (ref 80–100)
MONOCYTES # BLD AUTO: 1.62 K/UL — HIGH (ref 0–0.9)
MONOCYTES NFR BLD AUTO: 7.4 % — SIGNIFICANT CHANGE UP (ref 2–14)
NEUTROPHILS # BLD AUTO: 16.13 K/UL — HIGH (ref 1.8–7.4)
NEUTROPHILS NFR BLD AUTO: 73.7 % — SIGNIFICANT CHANGE UP (ref 43–77)
PLATELET # BLD AUTO: 413 K/UL — HIGH (ref 150–400)
POTASSIUM SERPL-MCNC: 4.1 MMOL/L — SIGNIFICANT CHANGE UP (ref 3.5–5.3)
POTASSIUM SERPL-SCNC: 4.1 MMOL/L — SIGNIFICANT CHANGE UP (ref 3.5–5.3)
RBC # BLD: 3.65 M/UL — LOW (ref 3.8–5.2)
RBC # FLD: 19.8 % — HIGH (ref 10.3–14.5)
SODIUM SERPL-SCNC: 141 MMOL/L — SIGNIFICANT CHANGE UP (ref 135–145)
WBC # BLD: 21.89 K/UL — HIGH (ref 3.8–10.5)
WBC # FLD AUTO: 21.89 K/UL — HIGH (ref 3.8–10.5)

## 2018-08-16 PROCEDURE — 93970 EXTREMITY STUDY: CPT | Mod: 26

## 2018-08-16 PROCEDURE — 72100 X-RAY EXAM L-S SPINE 2/3 VWS: CPT | Mod: 26

## 2018-08-16 RX ORDER — SIMETHICONE 80 MG/1
80 TABLET, CHEWABLE ORAL EVERY 4 HOURS
Qty: 0 | Refills: 0 | Status: DISCONTINUED | OUTPATIENT
Start: 2018-08-16 | End: 2018-08-17

## 2018-08-16 RX ORDER — SODIUM CHLORIDE 9 MG/ML
1000 INJECTION, SOLUTION INTRAVENOUS ONCE
Qty: 0 | Refills: 0 | Status: COMPLETED | OUTPATIENT
Start: 2018-08-16 | End: 2018-08-16

## 2018-08-16 RX ADMIN — SIMETHICONE 80 MILLIGRAM(S): 80 TABLET, CHEWABLE ORAL at 18:40

## 2018-08-16 RX ADMIN — Medication 100 MILLIGRAM(S): at 17:19

## 2018-08-16 RX ADMIN — Medication 1 MILLIGRAM(S): at 11:27

## 2018-08-16 RX ADMIN — SENNA PLUS 2 TABLET(S): 8.6 TABLET ORAL at 21:29

## 2018-08-16 RX ADMIN — Medication 100 MILLIGRAM(S): at 05:36

## 2018-08-16 RX ADMIN — Medication 100 MILLIGRAM(S): at 11:26

## 2018-08-16 RX ADMIN — OXYCODONE HYDROCHLORIDE 10 MILLIGRAM(S): 5 TABLET ORAL at 01:10

## 2018-08-16 RX ADMIN — Medication 100 MILLIGRAM(S): at 14:26

## 2018-08-16 RX ADMIN — Medication 100 MILLIGRAM(S): at 21:29

## 2018-08-16 RX ADMIN — Medication 1 TABLET(S): at 11:26

## 2018-08-16 RX ADMIN — Medication 1 TABLET(S): at 21:29

## 2018-08-16 RX ADMIN — Medication 500 MILLIGRAM(S): at 05:38

## 2018-08-16 RX ADMIN — SODIUM CHLORIDE 2000 MILLILITER(S): 9 INJECTION, SOLUTION INTRAVENOUS at 08:25

## 2018-08-16 RX ADMIN — OXYCODONE HYDROCHLORIDE 10 MILLIGRAM(S): 5 TABLET ORAL at 21:13

## 2018-08-16 RX ADMIN — OXYCODONE HYDROCHLORIDE 10 MILLIGRAM(S): 5 TABLET ORAL at 20:13

## 2018-08-16 RX ADMIN — OXYCODONE HYDROCHLORIDE 10 MILLIGRAM(S): 5 TABLET ORAL at 00:19

## 2018-08-16 RX ADMIN — Medication 1 TABLET(S): at 05:36

## 2018-08-16 RX ADMIN — Medication 1 TABLET(S): at 14:25

## 2018-08-16 RX ADMIN — OXYCODONE HYDROCHLORIDE 10 MILLIGRAM(S): 5 TABLET ORAL at 05:37

## 2018-08-16 RX ADMIN — Medication 100 MILLIGRAM(S): at 03:07

## 2018-08-16 NOTE — PROGRESS NOTE ADULT - SUBJECTIVE AND OBJECTIVE BOX
Pt reports back pain is present, but improving. No acute overnight events. Denies new-onset numbness/weakness, bladder/bowel changes, CP/SOB, calf pain.    Vital Signs Last 24 Hrs  T(C): 37.2 (16 Aug 2018 05:00), Max: 38 (15 Aug 2018 17:39)  T(F): 99 (16 Aug 2018 05:00), Max: 100.4 (15 Aug 2018 17:39)  HR: 117 (16 Aug 2018 05:00) (90 - 117)  BP: 128/71 (16 Aug 2018 05:00) (107/50 - 129/66)  BP(mean): --  RR: 16 (16 Aug 2018 05:00) (15 - 18)  SpO2: 98% (16 Aug 2018 05:00) (92% - 98%)    PE:  Gen: NAD  Spine:  PE limited by exam  Dressing CDI  TERRY x2 (R and L)  Compartments soft and compressible, no calf ttp  +EHL/FHL/Ta/Gsc  SILT L2-S1, L5-S1 sensation L>R  SILT C5-T1  LLE 5/5 hip flexion, knee extension, plantar/dorsiflexion, hallux extension  RLE 4/5 hip flexion, knee extension, 5/5 plantar/dorsiflexion, hallux extension  2+ DP

## 2018-08-16 NOTE — PROGRESS NOTE ADULT - ASSESSMENT
A/P:  49 yo F s/p PSIF L4-S1 w/ TLIF L5-S1 POD 2:  -pain control  -SCDs  -PT/OT/WBAT  -Continue to monitor drain outputs  -Continue ancef while drains in  -LSO brace at bedside  -WBXR ordered for when pt is able to ambulate  -dispo planning--likely 8/17 to home

## 2018-08-17 VITALS
OXYGEN SATURATION: 96 % | DIASTOLIC BLOOD PRESSURE: 98 MMHG | RESPIRATION RATE: 18 BRPM | SYSTOLIC BLOOD PRESSURE: 158 MMHG | HEART RATE: 92 BPM

## 2018-08-17 LAB
ANION GAP SERPL CALC-SCNC: 9 MMOL/L — SIGNIFICANT CHANGE UP (ref 5–17)
BASOPHILS # BLD AUTO: 0.04 K/UL — SIGNIFICANT CHANGE UP (ref 0–0.2)
BASOPHILS NFR BLD AUTO: 0.2 % — SIGNIFICANT CHANGE UP (ref 0–2)
BUN SERPL-MCNC: 10 MG/DL — SIGNIFICANT CHANGE UP (ref 7–23)
CALCIUM SERPL-MCNC: 9 MG/DL — SIGNIFICANT CHANGE UP (ref 8.5–10.1)
CHLORIDE SERPL-SCNC: 101 MMOL/L — SIGNIFICANT CHANGE UP (ref 96–108)
CO2 SERPL-SCNC: 31 MMOL/L — SIGNIFICANT CHANGE UP (ref 22–31)
CREAT SERPL-MCNC: 0.76 MG/DL — SIGNIFICANT CHANGE UP (ref 0.5–1.3)
EOSINOPHIL # BLD AUTO: 0.11 K/UL — SIGNIFICANT CHANGE UP (ref 0–0.5)
EOSINOPHIL NFR BLD AUTO: 0.5 % — SIGNIFICANT CHANGE UP (ref 0–6)
GLUCOSE SERPL-MCNC: 113 MG/DL — HIGH (ref 70–99)
HCT VFR BLD CALC: 28.9 % — LOW (ref 34.5–45)
HGB BLD-MCNC: 8.9 G/DL — LOW (ref 11.5–15.5)
IMM GRANULOCYTES NFR BLD AUTO: 0.6 % — SIGNIFICANT CHANGE UP (ref 0–1.5)
LYMPHOCYTES # BLD AUTO: 19.5 % — SIGNIFICANT CHANGE UP (ref 13–44)
LYMPHOCYTES # BLD AUTO: 4.34 K/UL — HIGH (ref 1–3.3)
MCHC RBC-ENTMCNC: 23.1 PG — LOW (ref 27–34)
MCHC RBC-ENTMCNC: 30.8 GM/DL — LOW (ref 32–36)
MCV RBC AUTO: 74.9 FL — LOW (ref 80–100)
MONOCYTES # BLD AUTO: 1.34 K/UL — HIGH (ref 0–0.9)
MONOCYTES NFR BLD AUTO: 6 % — SIGNIFICANT CHANGE UP (ref 2–14)
NEUTROPHILS # BLD AUTO: 16.35 K/UL — HIGH (ref 1.8–7.4)
NEUTROPHILS NFR BLD AUTO: 73.2 % — SIGNIFICANT CHANGE UP (ref 43–77)
PLATELET # BLD AUTO: 438 K/UL — HIGH (ref 150–400)
POTASSIUM SERPL-MCNC: 3.6 MMOL/L — SIGNIFICANT CHANGE UP (ref 3.5–5.3)
POTASSIUM SERPL-SCNC: 3.6 MMOL/L — SIGNIFICANT CHANGE UP (ref 3.5–5.3)
RBC # BLD: 3.86 M/UL — SIGNIFICANT CHANGE UP (ref 3.8–5.2)
RBC # FLD: 18.8 % — HIGH (ref 10.3–14.5)
SODIUM SERPL-SCNC: 141 MMOL/L — SIGNIFICANT CHANGE UP (ref 135–145)
WBC # BLD: 22.31 K/UL — HIGH (ref 3.8–10.5)
WBC # FLD AUTO: 22.31 K/UL — HIGH (ref 3.8–10.5)

## 2018-08-17 PROCEDURE — 71275 CT ANGIOGRAPHY CHEST: CPT | Mod: 26

## 2018-08-17 RX ADMIN — OXYCODONE HYDROCHLORIDE 10 MILLIGRAM(S): 5 TABLET ORAL at 07:06

## 2018-08-17 RX ADMIN — Medication 100 MILLIGRAM(S): at 06:01

## 2018-08-17 RX ADMIN — Medication 1 TABLET(S): at 12:19

## 2018-08-17 RX ADMIN — OXYCODONE HYDROCHLORIDE 10 MILLIGRAM(S): 5 TABLET ORAL at 06:06

## 2018-08-17 RX ADMIN — Medication 500 MILLIGRAM(S): at 06:01

## 2018-08-17 RX ADMIN — Medication 1 MILLIGRAM(S): at 12:19

## 2018-08-17 RX ADMIN — SIMETHICONE 80 MILLIGRAM(S): 80 TABLET, CHEWABLE ORAL at 02:54

## 2018-08-17 RX ADMIN — OXYCODONE HYDROCHLORIDE 10 MILLIGRAM(S): 5 TABLET ORAL at 12:10

## 2018-08-17 RX ADMIN — OXYCODONE HYDROCHLORIDE 10 MILLIGRAM(S): 5 TABLET ORAL at 10:42

## 2018-08-17 RX ADMIN — Medication 100 MILLIGRAM(S): at 10:42

## 2018-08-17 RX ADMIN — Medication 1 TABLET(S): at 13:54

## 2018-08-17 RX ADMIN — Medication 100 MILLIGRAM(S): at 13:54

## 2018-08-17 RX ADMIN — Medication 1 TABLET(S): at 06:02

## 2018-08-17 RX ADMIN — Medication 100 MILLIGRAM(S): at 02:28

## 2018-08-17 NOTE — PROGRESS NOTE ADULT - SUBJECTIVE AND OBJECTIVE BOX
Pt seen and examined at bedside, Pain is curently controlled, no acute events overnight. Denies CP/SOB/calf pain.    Vital Signs Last 24 Hrs  T(C): 37.2 (16 Aug 2018 05:00), Max: 38 (15 Aug 2018 17:39)  T(F): 99 (16 Aug 2018 05:00), Max: 100.4 (15 Aug 2018 17:39)  HR: 117 (16 Aug 2018 05:00) (90 - 117)  BP: 128/71 (16 Aug 2018 05:00) (107/50 - 129/66)  BP(mean): --  RR: 16 (16 Aug 2018 05:00) (15 - 18)  SpO2: 98% (16 Aug 2018 05:00) (92% - 98%)    PE:  Gen: NAD  Spine:  Dressing CDI  TERRY x2 (R and L)  Compartments soft and compressible, no calf ttp  +EHL/FHL/Ta/Gsc  SILT L2-S1  SILT C5-T1, having some numbness in right hand C8  L3-S1 5/5 Bilateral  +DP Pulses

## 2018-08-17 NOTE — PROGRESS NOTE ADULT - ASSESSMENT
47 yo F s/p PSIF L4-S1 w/ TLIF L5-S1 POD 3:  -pain control  -SCDs  -PT/OT/WBAT  -monitor HR and O2 sat  -CTPA r/o PE: pt is tachy and had dips in O2 sat overnight  -Continue to monitor drain outputs  -Continue ancef while drains in  -LSO brace at bedside  -dispo planning

## 2018-08-22 DIAGNOSIS — M48.07 SPINAL STENOSIS, LUMBOSACRAL REGION: ICD-10-CM

## 2018-08-22 DIAGNOSIS — J45.909 UNSPECIFIED ASTHMA, UNCOMPLICATED: ICD-10-CM

## 2018-08-22 DIAGNOSIS — N92.0 EXCESSIVE AND FREQUENT MENSTRUATION WITH REGULAR CYCLE: ICD-10-CM

## 2018-08-22 DIAGNOSIS — M51.17 INTERVERTEBRAL DISC DISORDERS WITH RADICULOPATHY, LUMBOSACRAL REGION: ICD-10-CM

## 2018-08-22 DIAGNOSIS — D50.9 IRON DEFICIENCY ANEMIA, UNSPECIFIED: ICD-10-CM

## 2018-08-22 DIAGNOSIS — D25.9 LEIOMYOMA OF UTERUS, UNSPECIFIED: ICD-10-CM

## 2018-08-22 DIAGNOSIS — I51.7 CARDIOMEGALY: ICD-10-CM
